# Patient Record
Sex: FEMALE | Race: WHITE
[De-identification: names, ages, dates, MRNs, and addresses within clinical notes are randomized per-mention and may not be internally consistent; named-entity substitution may affect disease eponyms.]

---

## 2021-10-07 ENCOUNTER — HOSPITAL ENCOUNTER (OUTPATIENT)
Dept: HOSPITAL 53 - M LABSMTC | Age: 66
End: 2021-10-07
Attending: ANESTHESIOLOGY
Payer: COMMERCIAL

## 2021-10-07 DIAGNOSIS — Z01.818: Primary | ICD-10-CM

## 2021-10-07 DIAGNOSIS — Z11.52: ICD-10-CM

## 2021-10-12 ENCOUNTER — HOSPITAL ENCOUNTER (OUTPATIENT)
Dept: HOSPITAL 53 - M SDC | Age: 66
Discharge: HOME | End: 2021-10-12
Attending: INTERNAL MEDICINE
Payer: MEDICARE

## 2021-10-12 VITALS — DIASTOLIC BLOOD PRESSURE: 70 MMHG | SYSTOLIC BLOOD PRESSURE: 118 MMHG

## 2021-10-12 VITALS — WEIGHT: 182.8 LBS | BODY MASS INDEX: 35.89 KG/M2 | HEIGHT: 60 IN

## 2021-10-12 DIAGNOSIS — Z85.3: ICD-10-CM

## 2021-10-12 DIAGNOSIS — R93.5: ICD-10-CM

## 2021-10-12 DIAGNOSIS — R93.2: ICD-10-CM

## 2021-10-12 DIAGNOSIS — R10.9: Primary | ICD-10-CM

## 2021-10-12 DIAGNOSIS — K21.9: ICD-10-CM

## 2021-10-12 DIAGNOSIS — E78.5: ICD-10-CM

## 2021-10-12 DIAGNOSIS — K83.8: ICD-10-CM

## 2021-10-12 DIAGNOSIS — Z79.899: ICD-10-CM

## 2021-10-12 DIAGNOSIS — Z92.21: ICD-10-CM

## 2021-10-12 PROCEDURE — 74330 X-RAY BILE/PANC ENDOSCOPY: CPT

## 2021-10-12 PROCEDURE — 43274 ERCP DUCT STENT PLACEMENT: CPT

## 2021-10-12 PROCEDURE — 43262 ENDO CHOLANGIOPANCREATOGRAPH: CPT

## 2021-10-12 PROCEDURE — 88104 CYTOPATH FL NONGYN SMEARS: CPT

## 2021-10-12 NOTE — REP
INDICATION:

ABNORMAL BILE DUCT.



COMPARISON:

None.



TECHNIQUE:

Multiple C-arm views right upper quadrant during ERCP.



FINDINGS:

Contrast opacifies intrahepatic bile ducts as well as the common bile duct.  There is

no persistent filling defect or stricture of the visualized bile ducts.  The cystic

duct is visualized.  Contrast extends into the gallbladder, which is partially

visualized, with no definite persistent filling defect.  The distal pancreatic duct is

partially opacified.



IMPRESSION:

170 seconds of fluoroscopy time was utilized.





<Electronically signed by Eduard Amador > 10/12/21 [id number]

## 2021-10-12 NOTE — ROOR
________________________________________________________________________________

Patient Name: Rhoda Uribe              Procedure Date: 10/12/2021 3:19 PM

MRN: [id number]                          Account Number: [id number]

YOB: 1955                Age: 66

Room: [facility]                            Gender: Female

Note Status: Finalized                 

________________________________________________________________________________

 

Procedure:            ERCP

Indications:          Abdominal pain of suspected biliary origin, Abnormal 

                      MRCP, Abnormal abdominal ultrasound

Providers:            Jonathon Bai MD

Referring MD:         Aziza Porter MD

Requesting Provider:  

Medicines:            Monitored Anesthesia Care

Complications:        No immediate complications.

________________________________________________________________________________

Procedure:            Pre-Anesthesia Assessment:

                      - The heart rate, respiratory rate, oxygen saturations, 

                      blood pressure, adequacy of pulmonary ventilation, and 

                      response to care were monitored throughout the procedure.

                      The Duodenoscope was introduced through the mouth, and 

                      advanced to the duodenum and used to inject contrast 

                      into the bile duct. The ERCP was accomplished without 

                      difficulty. The patient tolerated the procedure well.

                                                                                

Findings:

     The  film was normal. The esophagus was successfully intubated under 

     direct vision without detailed examination of the pharynx, larynx, and 

     associated structures, and upper GI tract. The upper GI tract was grossly 

     normal. The major papilla was small. A wire was passed into the biliary 

     tree. The bile duct was then deeply cannulated over the guidewire. 

     Contrast was injected. I personally interpreted the bile duct images. 

     Ductal flow of contrast was adequate. Image quality was adequate. The 

     middle and upper third of the main bile duct were mildly dilated. The 

     largest diameter was 10 mm. The lower third of the main bile duct was 

     mildly narrowed for the last 1.5 cm. The transition is a smooth change in 

     caliber. This is of uncertain significance. The diameter of the duct for 

     the last 1.5 cm distally is 5 mm. A biliary sphincterotomy was made with 

     a traction (standard) sphincterotome using ERBE electrocautery. There was 

     no post-sphincterotomy bleeding. Prompt rapid drainage of bile and 

     contrast was noted after the sphincterotomy, suggesting the small papilla 

     was likely stenotic. The biliary tree was swept with a 12 mm balloon 

     starting at the bifurcation. Nothing was found. Cells for cytology were 

     obtained by brushing in the lower third of the main bile duct. One 4 Fr 

     by 3 cm temporary stent with a 3/4 internal pigtail was placed into the 

     ventral pancreatic duct. The stent was in good position.

                                                                                

Impression:           - The major papilla appeared to be small-suspect 

                      papillary stenosis.

                      - The upper third of the main bile duct and middle third 

                      of the main bile duct were dilated.

                      - The lower third of the main bile duct was mildly 

                      narrowed. The transition in caliber is smooth and benign 

                      appearing. It is of uncertain significance.

                      - A biliary sphincterotomy was performed.

                      - The biliary tree was swept and nothing was found.

                      - Cells for cytology obtained in the lower third of the 

                      main duct.

                      - One temporary stent was placed into the ventral 

                      pancreatic duct.

Recommendation:       - Await cytology results.

                      - Observe patient's clinical course.

                      - Confirm spontaneous stent passage by performing a KUB 

                      x-ray in 1 week. - My office will call you in the next 

                      few days to set you up for this study/exam.

                      - Return to my office in 3 weeks.

                                                                                

Procedure Code(s):    --- Professional ---

                      72919, Endoscopic retrograde cholangiopancreatography 

                      (ERCP); with placement of endoscopic stent into biliary 

                      or pancreatic duct, including pre- and post-dilation and 

                      guide wire passage, when performed, including 

                      sphincterotomy, when performed, each stent

                      79638, 59, Endoscopic retrograde 

                      cholangiopancreatography (ERCP); with 

                      sphincterotomy/papillotomy

Diagnosis Code(s):    --- Professional ---

                      R93.2, Abnormal findings on diagnostic imaging of liver 

                      and biliary tract

                      R93.5, Abnormal findings on diagnostic imaging of other 

                      abdominal regions, including retroperitoneum

                      R10.9, Unspecified abdominal pain

                      K83.8, Other specified diseases of biliary tract

 

CPT copyright 2019 [employer]. All rights reserved.

 

The codes documented in this report are preliminary and upon  review may 

be revised to meet current compliance requirements.

 

Jonathon Bai MD

________________

Jonathon Bai MD

10/12/2021 4:52:04 PM

Electronically signed by Jonathon Bai MD

Number of Addenda: 0

 

Note Initiated On: 10/12/2021 3:19 PM

Estimated Blood Loss: Estimated blood loss: none.